# Patient Record
Sex: FEMALE | Race: WHITE | NOT HISPANIC OR LATINO | URBAN - METROPOLITAN AREA
[De-identification: names, ages, dates, MRNs, and addresses within clinical notes are randomized per-mention and may not be internally consistent; named-entity substitution may affect disease eponyms.]

---

## 2017-04-04 ENCOUNTER — FOLLOW UP ESTABLISHED (OUTPATIENT)
Dept: URBAN - METROPOLITAN AREA CLINIC 60 | Facility: CLINIC | Age: 67
End: 2017-04-04
Payer: MEDICARE

## 2017-04-04 PROCEDURE — 92250 FUNDUS PHOTOGRAPHY W/I&R: CPT | Performed by: OPTOMETRIST

## 2017-04-04 PROCEDURE — 92015 DETERMINE REFRACTIVE STATE: CPT | Performed by: OPTOMETRIST

## 2017-04-04 PROCEDURE — 92014 COMPRE OPH EXAM EST PT 1/>: CPT | Performed by: OPTOMETRIST

## 2017-04-04 ASSESSMENT — INTRAOCULAR PRESSURE
OS: 20
OD: 19

## 2017-04-04 ASSESSMENT — VISUAL ACUITY
OD: 20/30
OS: 20/40

## 2017-10-09 ENCOUNTER — FOLLOW UP ESTABLISHED (OUTPATIENT)
Dept: URBAN - METROPOLITAN AREA CLINIC 60 | Facility: CLINIC | Age: 67
End: 2017-10-09
Payer: MEDICARE

## 2017-10-09 DIAGNOSIS — H25.813 COMBINED FORMS OF AGE-RELATED CATARACT, BILATERAL: Primary | ICD-10-CM

## 2017-10-09 PROCEDURE — 92015 DETERMINE REFRACTIVE STATE: CPT | Performed by: OPTOMETRIST

## 2017-10-09 PROCEDURE — 92014 COMPRE OPH EXAM EST PT 1/>: CPT | Performed by: OPTOMETRIST

## 2017-10-09 ASSESSMENT — INTRAOCULAR PRESSURE
OD: 21
OS: 23

## 2017-10-09 ASSESSMENT — VISUAL ACUITY
OD: 20/25
OS: 20/50

## 2017-11-09 ENCOUNTER — Encounter (OUTPATIENT)
Dept: URBAN - METROPOLITAN AREA CLINIC 60 | Facility: CLINIC | Age: 67
End: 2017-11-09
Payer: MEDICARE

## 2017-11-09 DIAGNOSIS — H25.13 AGE-RELATED NUCLEAR CATARACT, BILATERAL: ICD-10-CM

## 2017-11-09 DIAGNOSIS — Z01.818 ENCOUNTER FOR OTHER PREPROCEDURAL EXAMINATION: Primary | ICD-10-CM

## 2017-11-09 PROCEDURE — 99213 OFFICE O/P EST LOW 20 MIN: CPT | Performed by: NURSE PRACTITIONER

## 2017-11-10 ENCOUNTER — NEW PATIENT (OUTPATIENT)
Dept: URBAN - METROPOLITAN AREA CLINIC 60 | Facility: CLINIC | Age: 67
End: 2017-11-10
Payer: MEDICARE

## 2017-11-10 DIAGNOSIS — H40.013 OPEN ANGLE WITH BORDERLINE FINDINGS, LOW RISK, BILATERAL: ICD-10-CM

## 2017-11-10 DIAGNOSIS — H25.812 COMBINED FORMS OF AGE-RELATED CATARACT, LEFT EYE: Primary | ICD-10-CM

## 2017-11-10 PROCEDURE — 92014 COMPRE OPH EXAM EST PT 1/>: CPT | Performed by: OPHTHALMOLOGY

## 2017-11-10 ASSESSMENT — INTRAOCULAR PRESSURE
OD: 18
OS: 19

## 2017-11-10 ASSESSMENT — VISUAL ACUITY
OD: 20/30
OS: 20/50

## 2017-11-15 ENCOUNTER — SURGERY (OUTPATIENT)
Dept: URBAN - METROPOLITAN AREA SURGERY 36 | Facility: SURGERY | Age: 67
End: 2017-11-15
Payer: MEDICARE

## 2017-11-15 PROCEDURE — 66984 XCAPSL CTRC RMVL W/O ECP: CPT | Performed by: OPHTHALMOLOGY

## 2017-11-16 ENCOUNTER — POST OP (OUTPATIENT)
Dept: URBAN - METROPOLITAN AREA CLINIC 60 | Facility: CLINIC | Age: 67
End: 2017-11-16
Payer: MEDICARE

## 2017-11-16 PROCEDURE — 99024 POSTOP FOLLOW-UP VISIT: CPT | Performed by: OPTOMETRIST

## 2017-11-16 ASSESSMENT — INTRAOCULAR PRESSURE: OS: 28

## 2017-11-21 ENCOUNTER — POST OP (OUTPATIENT)
Dept: URBAN - METROPOLITAN AREA CLINIC 60 | Facility: CLINIC | Age: 67
End: 2017-11-21
Payer: MEDICARE

## 2017-11-21 PROCEDURE — 99024 POSTOP FOLLOW-UP VISIT: CPT | Performed by: OPTOMETRIST

## 2017-11-21 ASSESSMENT — VISUAL ACUITY
OS: 20/20
OD: 20/30

## 2017-11-21 ASSESSMENT — INTRAOCULAR PRESSURE
OS: 14
OD: 15

## 2017-12-05 ENCOUNTER — POST OP (OUTPATIENT)
Dept: URBAN - METROPOLITAN AREA CLINIC 60 | Facility: CLINIC | Age: 67
End: 2017-12-05
Payer: MEDICARE

## 2017-12-05 PROCEDURE — 99024 POSTOP FOLLOW-UP VISIT: CPT | Performed by: OPTOMETRIST

## 2017-12-05 ASSESSMENT — VISUAL ACUITY
OS: 20/20
OD: 20/40

## 2017-12-05 ASSESSMENT — INTRAOCULAR PRESSURE
OD: 18
OS: 18

## 2018-11-29 ENCOUNTER — FOLLOW UP ESTABLISHED (OUTPATIENT)
Dept: URBAN - METROPOLITAN AREA CLINIC 60 | Facility: CLINIC | Age: 68
End: 2018-11-29
Payer: COMMERCIAL

## 2018-11-29 DIAGNOSIS — R73.03 OTHER ABNORMAL GLUCOSE: ICD-10-CM

## 2018-11-29 DIAGNOSIS — H25.811 COMBINED FORMS OF AGE-RELATED CATARACT, RIGHT EYE: ICD-10-CM

## 2018-11-29 DIAGNOSIS — H52.4 PRESBYOPIA: ICD-10-CM

## 2018-11-29 DIAGNOSIS — R51 HEADACHE: Primary | ICD-10-CM

## 2018-11-29 PROCEDURE — 92133 CPTRZD OPH DX IMG PST SGM ON: CPT | Performed by: OPTOMETRIST

## 2018-11-29 PROCEDURE — 92014 COMPRE OPH EXAM EST PT 1/>: CPT | Performed by: OPTOMETRIST

## 2018-11-29 ASSESSMENT — VISUAL ACUITY
OD: 20/40
OS: 20/20

## 2018-11-29 ASSESSMENT — INTRAOCULAR PRESSURE
OS: 18
OD: 18

## 2019-01-04 ENCOUNTER — FOLLOW UP ESTABLISHED (OUTPATIENT)
Dept: URBAN - METROPOLITAN AREA CLINIC 60 | Facility: CLINIC | Age: 69
End: 2019-01-04
Payer: COMMERCIAL

## 2019-01-04 PROCEDURE — 92014 COMPRE OPH EXAM EST PT 1/>: CPT | Performed by: OPHTHALMOLOGY

## 2019-01-04 ASSESSMENT — INTRAOCULAR PRESSURE
OD: 18
OS: 19

## 2019-01-04 ASSESSMENT — VISUAL ACUITY
OS: 20/20
OD: 20/50

## 2019-01-15 ENCOUNTER — Encounter (OUTPATIENT)
Dept: URBAN - METROPOLITAN AREA CLINIC 60 | Facility: CLINIC | Age: 69
End: 2019-01-15
Payer: COMMERCIAL

## 2019-01-15 PROCEDURE — 99213 OFFICE O/P EST LOW 20 MIN: CPT | Performed by: PHYSICIAN ASSISTANT

## 2019-01-22 ENCOUNTER — SURGERY (OUTPATIENT)
Dept: URBAN - METROPOLITAN AREA SURGERY 36 | Facility: SURGERY | Age: 69
End: 2019-01-22
Payer: COMMERCIAL

## 2019-01-22 PROCEDURE — 66984 XCAPSL CTRC RMVL W/O ECP: CPT | Performed by: OPHTHALMOLOGY

## 2019-01-23 ENCOUNTER — POST OP (OUTPATIENT)
Dept: URBAN - METROPOLITAN AREA CLINIC 60 | Facility: CLINIC | Age: 69
End: 2019-01-23

## 2019-01-23 DIAGNOSIS — Z96.1 PRESENCE OF INTRAOCULAR LENS: Primary | ICD-10-CM

## 2019-01-23 PROCEDURE — 99024 POSTOP FOLLOW-UP VISIT: CPT | Performed by: OPTOMETRIST

## 2019-01-23 ASSESSMENT — INTRAOCULAR PRESSURE
OD: 25
OS: 16

## 2019-01-29 ENCOUNTER — POST OP (OUTPATIENT)
Dept: URBAN - METROPOLITAN AREA CLINIC 60 | Facility: CLINIC | Age: 69
End: 2019-01-29

## 2019-01-29 PROCEDURE — 99024 POSTOP FOLLOW-UP VISIT: CPT | Performed by: OPTOMETRIST

## 2019-01-29 ASSESSMENT — VISUAL ACUITY
OD: 20/25
OS: 20/20

## 2019-01-29 ASSESSMENT — INTRAOCULAR PRESSURE
OD: 17
OS: 18

## 2019-02-12 ENCOUNTER — POST OP (OUTPATIENT)
Dept: URBAN - METROPOLITAN AREA CLINIC 60 | Facility: CLINIC | Age: 69
End: 2019-02-12

## 2019-02-12 PROCEDURE — 99024 POSTOP FOLLOW-UP VISIT: CPT | Performed by: OPTOMETRIST

## 2019-02-12 PROCEDURE — 92015 DETERMINE REFRACTIVE STATE: CPT | Performed by: OPTOMETRIST

## 2019-02-12 ASSESSMENT — INTRAOCULAR PRESSURE
OS: 17
OD: 18

## 2019-02-12 ASSESSMENT — VISUAL ACUITY
OD: 20/20
OS: 20/20

## 2020-11-10 ENCOUNTER — FOLLOW UP ESTABLISHED (OUTPATIENT)
Dept: URBAN - METROPOLITAN AREA CLINIC 60 | Facility: CLINIC | Age: 70
End: 2020-11-10
Payer: COMMERCIAL

## 2020-11-10 DIAGNOSIS — H26.493 OTHER SECONDARY CATARACT, BILATERAL: ICD-10-CM

## 2020-11-10 PROCEDURE — 92014 COMPRE OPH EXAM EST PT 1/>: CPT | Performed by: OPTOMETRIST

## 2020-11-10 PROCEDURE — 92082 INTERMEDIATE VISUAL FIELD XM: CPT | Performed by: OPTOMETRIST

## 2020-11-10 PROCEDURE — 92133 CPTRZD OPH DX IMG PST SGM ON: CPT | Performed by: OPTOMETRIST

## 2020-11-10 ASSESSMENT — VISUAL ACUITY
OD: 20/20
OS: 20/20

## 2020-11-10 ASSESSMENT — INTRAOCULAR PRESSURE
OS: 18
OD: 17

## 2021-11-29 ENCOUNTER — OFFICE VISIT (OUTPATIENT)
Dept: URBAN - METROPOLITAN AREA CLINIC 60 | Facility: CLINIC | Age: 71
End: 2021-11-29
Payer: COMMERCIAL

## 2021-11-29 DIAGNOSIS — H52.13 MYOPIA, BILATERAL: Primary | ICD-10-CM

## 2021-11-29 PROCEDURE — 92014 COMPRE OPH EXAM EST PT 1/>: CPT | Performed by: OPTOMETRIST

## 2021-11-29 ASSESSMENT — INTRAOCULAR PRESSURE
OD: 18
OS: 18

## 2021-11-29 ASSESSMENT — VISUAL ACUITY
OS: 20/25
OD: 20/25

## 2021-11-29 NOTE — IMPRESSION/PLAN
Impression: Myopia, bilateral: H52.13. Plan: New glasses Rx was given today. Patient made aware of adaption to change in prescription OD.

## 2022-11-17 ENCOUNTER — OFFICE VISIT (OUTPATIENT)
Dept: URBAN - METROPOLITAN AREA CLINIC 59 | Facility: CLINIC | Age: 72
End: 2022-11-17
Payer: COMMERCIAL

## 2022-11-17 DIAGNOSIS — E11.9 TYPE 2 DIABETES MELLITUS W/O COMPLICATION: ICD-10-CM

## 2022-11-17 DIAGNOSIS — H26.493 OTHER SECONDARY CATARACT, BILATERAL: ICD-10-CM

## 2022-11-17 DIAGNOSIS — Z96.1 PRESENCE OF INTRAOCULAR LENS: ICD-10-CM

## 2022-11-17 DIAGNOSIS — H40.013 OPEN ANGLE WITH BORDERLINE FINDINGS, LOW RISK, BILATERAL: Primary | ICD-10-CM

## 2022-11-17 PROCEDURE — 99214 OFFICE O/P EST MOD 30 MIN: CPT | Performed by: OPTOMETRIST

## 2022-11-17 PROCEDURE — 92133 CPTRZD OPH DX IMG PST SGM ON: CPT | Performed by: OPTOMETRIST

## 2022-11-17 ASSESSMENT — INTRAOCULAR PRESSURE
OS: 18
OD: 18

## 2022-11-17 NOTE — IMPRESSION/PLAN
Impression: Open angle with borderline findings, low risk, bilateral; secondary to [employer] 72 appearance *Family hx of glaucoma (mother) Plan: Low suspicion but family history of glaucoma. Patient educated on findings. Stable IOP. OCT RNFL was done today to monitor status for progression. No need for drop treatment at this time. Monitor. Repeat OCT RNFL 1 year. OCT RNFL OD: AVE T: 82 - borderline inferior nasal/stable                   OS: AVE T: 78- borderline inferior nasal/stable

## 2022-11-17 NOTE — IMPRESSION/PLAN
Impression: Other secondary cataract, bilateral Plan: Opacified capsule not affecting vision. No indication for treatment at this time. Return to clinic if there is a decrease in vision.

## 2022-11-17 NOTE — IMPRESSION/PLAN
Impression: Type 2 diabetes mellitus w/o complication: A52.9. Diet controlled per patient Plan: No diabetic retinopathy. Recommend yearly diabetic eye exam. Discussed with patient importance of good blood sugar control with regular visits with PCP.

## 2023-11-16 ENCOUNTER — Encounter (OUTPATIENT)
Dept: URBAN - METROPOLITAN AREA CLINIC 59 | Facility: CLINIC | Age: 73
End: 2023-11-16
Payer: COMMERCIAL

## 2023-11-16 DIAGNOSIS — Z96.1 PRESENCE OF INTRAOCULAR LENS: ICD-10-CM

## 2023-11-16 DIAGNOSIS — H52.4 PRESBYOPIA: ICD-10-CM

## 2023-11-16 DIAGNOSIS — H26.493 OTHER SECONDARY CATARACT, BILATERAL: Primary | ICD-10-CM

## 2023-11-16 PROCEDURE — 92012 INTRM OPH EXAM EST PATIENT: CPT | Performed by: OPTOMETRIST

## 2024-10-28 ENCOUNTER — OFFICE VISIT (OUTPATIENT)
Dept: URBAN - METROPOLITAN AREA CLINIC 59 | Facility: CLINIC | Age: 74
End: 2024-10-28
Payer: COMMERCIAL

## 2024-10-28 DIAGNOSIS — H52.13 MYOPIA, BILATERAL: ICD-10-CM

## 2024-10-28 DIAGNOSIS — H52.4 PRESBYOPIA: Primary | ICD-10-CM

## 2024-10-28 DIAGNOSIS — H26.493 OTHER SECONDARY CATARACT, BILATERAL: ICD-10-CM

## 2024-10-28 DIAGNOSIS — H40.013 OPEN ANGLE WITH BORDERLINE FINDINGS, LOW RISK, BILATERAL: ICD-10-CM

## 2024-10-28 DIAGNOSIS — Z96.1 PRESENCE OF INTRAOCULAR LENS: ICD-10-CM

## 2024-10-28 DIAGNOSIS — E11.9 TYPE 2 DIABETES MELLITUS W/O COMPLICATION: ICD-10-CM

## 2024-10-28 PROCEDURE — 92014 COMPRE OPH EXAM EST PT 1/>: CPT | Performed by: OPTOMETRIST

## 2024-10-28 ASSESSMENT — VISUAL ACUITY
OS: 20/25
OD: 20/30

## 2024-10-28 ASSESSMENT — INTRAOCULAR PRESSURE
OD: 18
OS: 19